# Patient Record
Sex: FEMALE | Race: BLACK OR AFRICAN AMERICAN | NOT HISPANIC OR LATINO
[De-identification: names, ages, dates, MRNs, and addresses within clinical notes are randomized per-mention and may not be internally consistent; named-entity substitution may affect disease eponyms.]

---

## 2017-01-30 ENCOUNTER — FORM ENCOUNTER (OUTPATIENT)
Age: 52
End: 2017-01-30

## 2017-01-31 ENCOUNTER — APPOINTMENT (OUTPATIENT)
Dept: INTERNAL MEDICINE | Facility: CLINIC | Age: 52
End: 2017-01-31

## 2017-01-31 ENCOUNTER — LABORATORY RESULT (OUTPATIENT)
Age: 52
End: 2017-01-31

## 2017-01-31 ENCOUNTER — OUTPATIENT (OUTPATIENT)
Dept: OUTPATIENT SERVICES | Facility: HOSPITAL | Age: 52
LOS: 1 days | End: 2017-01-31
Payer: COMMERCIAL

## 2017-01-31 VITALS
HEIGHT: 65 IN | SYSTOLIC BLOOD PRESSURE: 118 MMHG | RESPIRATION RATE: 15 BRPM | HEART RATE: 68 BPM | WEIGHT: 249 LBS | TEMPERATURE: 98.8 F | BODY MASS INDEX: 41.48 KG/M2 | OXYGEN SATURATION: 98 % | DIASTOLIC BLOOD PRESSURE: 80 MMHG

## 2017-01-31 DIAGNOSIS — Z01.818 ENCOUNTER FOR OTHER PREPROCEDURAL EXAMINATION: ICD-10-CM

## 2017-01-31 DIAGNOSIS — Z00.00 ENCOUNTER FOR GENERAL ADULT MEDICAL EXAMINATION W/OUT ABNORMAL FINDINGS: ICD-10-CM

## 2017-01-31 PROCEDURE — 71020: CPT | Mod: 26

## 2017-01-31 PROCEDURE — 71046 X-RAY EXAM CHEST 2 VIEWS: CPT

## 2017-02-01 ENCOUNTER — FORM ENCOUNTER (OUTPATIENT)
Age: 52
End: 2017-02-01

## 2017-02-01 LAB
25(OH)D3 SERPL-MCNC: 30 NG/ML
ALBUMIN SERPL ELPH-MCNC: 4.3 G/DL
ALP BLD-CCNC: 62 U/L
ALT SERPL-CCNC: 21 U/L
ANION GAP SERPL CALC-SCNC: 16 MMOL/L
APPEARANCE: CLEAR
APPEARANCE: CLEAR
APTT BLD: 32.1 SEC
AST SERPL-CCNC: 14 U/L
BASOPHILS # BLD AUTO: 0.02 K/UL
BASOPHILS NFR BLD AUTO: 0.4 %
BILIRUB SERPL-MCNC: 0.7 MG/DL
BILIRUBIN URINE: NEGATIVE
BILIRUBIN URINE: NEGATIVE
BLOOD URINE: NEGATIVE
BLOOD URINE: NEGATIVE
BUN SERPL-MCNC: 11 MG/DL
CALCIUM SERPL-MCNC: 9.9 MG/DL
CHLORIDE SERPL-SCNC: 104 MMOL/L
CHOLEST SERPL-MCNC: 212 MG/DL
CHOLEST/HDLC SERPL: 2.5 RATIO
CO2 SERPL-SCNC: 21 MMOL/L
COLOR: YELLOW
COLOR: YELLOW
CREAT SERPL-MCNC: 0.78 MG/DL
EOSINOPHIL # BLD AUTO: 0.05 K/UL
EOSINOPHIL NFR BLD AUTO: 1.1 %
GLUCOSE QUALITATIVE U: NORMAL MG/DL
GLUCOSE QUALITATIVE U: NORMAL MG/DL
GLUCOSE SERPL-MCNC: 91 MG/DL
HBA1C MFR BLD HPLC: 5.6 %
HCT VFR BLD CALC: 39.8 %
HDLC SERPL-MCNC: 86 MG/DL
HGB BLD-MCNC: 12.5 G/DL
IMM GRANULOCYTES NFR BLD AUTO: 0 %
INR PPP: 1.02 RATIO
KETONES URINE: NEGATIVE
KETONES URINE: NEGATIVE
LDLC SERPL CALC-MCNC: 113 MG/DL
LEUKOCYTE ESTERASE URINE: ABNORMAL
LEUKOCYTE ESTERASE URINE: ABNORMAL
LYMPHOCYTES # BLD AUTO: 1.65 K/UL
LYMPHOCYTES NFR BLD AUTO: 35.9 %
MAN DIFF?: NORMAL
MCHC RBC-ENTMCNC: 29.9 PG
MCHC RBC-ENTMCNC: 31.4 GM/DL
MCV RBC AUTO: 95.2 FL
MONOCYTES # BLD AUTO: 0.27 K/UL
MONOCYTES NFR BLD AUTO: 5.9 %
NEUTROPHILS # BLD AUTO: 2.6 K/UL
NEUTROPHILS NFR BLD AUTO: 56.7 %
NITRITE URINE: NEGATIVE
NITRITE URINE: NEGATIVE
PH URINE: 7
PH URINE: 7
PLATELET # BLD AUTO: 269 K/UL
POTASSIUM SERPL-SCNC: 4.1 MMOL/L
PROT SERPL-MCNC: 7.5 G/DL
PROTEIN URINE: NEGATIVE MG/DL
PROTEIN URINE: NEGATIVE MG/DL
PT BLD: 11.5 SEC
RBC # BLD: 4.18 M/UL
RBC # FLD: 14 %
SODIUM SERPL-SCNC: 141 MMOL/L
SPECIFIC GRAVITY URINE: 1.01
SPECIFIC GRAVITY URINE: 1.01
TRIGL SERPL-MCNC: 63 MG/DL
UROBILINOGEN URINE: NORMAL MG/DL
UROBILINOGEN URINE: NORMAL MG/DL
WBC # FLD AUTO: 4.59 K/UL

## 2017-02-02 ENCOUNTER — OUTPATIENT (OUTPATIENT)
Dept: OUTPATIENT SERVICES | Facility: HOSPITAL | Age: 52
LOS: 1 days | End: 2017-02-02
Payer: COMMERCIAL

## 2017-02-02 PROCEDURE — 73700 CT LOWER EXTREMITY W/O DYE: CPT

## 2017-02-02 PROCEDURE — 73700 CT LOWER EXTREMITY W/O DYE: CPT | Mod: 26,50

## 2017-02-06 ENCOUNTER — OUTPATIENT (OUTPATIENT)
Dept: OUTPATIENT SERVICES | Facility: HOSPITAL | Age: 52
LOS: 1 days | End: 2017-02-06
Payer: COMMERCIAL

## 2017-02-06 DIAGNOSIS — Z22.321 CARRIER OR SUSPECTED CARRIER OF METHICILLIN SUSCEPTIBLE STAPHYLOCOCCUS AUREUS: ICD-10-CM

## 2017-02-06 LAB
MRSA PCR RESULT.: NEGATIVE — SIGNIFICANT CHANGE UP
S AUREUS DNA NOSE QL NAA+PROBE: NEGATIVE — SIGNIFICANT CHANGE UP

## 2017-02-06 PROCEDURE — 87641 MR-STAPH DNA AMP PROBE: CPT

## 2017-02-07 ENCOUNTER — TRANSCRIPTION ENCOUNTER (OUTPATIENT)
Age: 52
End: 2017-02-07

## 2017-02-09 ENCOUNTER — MEDICATION RENEWAL (OUTPATIENT)
Age: 52
End: 2017-02-09

## 2017-02-15 ENCOUNTER — RESULT REVIEW (OUTPATIENT)
Age: 52
End: 2017-02-15

## 2017-02-15 VITALS
WEIGHT: 242.51 LBS | TEMPERATURE: 98 F | HEART RATE: 73 BPM | RESPIRATION RATE: 16 BRPM | OXYGEN SATURATION: 98 % | HEIGHT: 65 IN | SYSTOLIC BLOOD PRESSURE: 144 MMHG | DIASTOLIC BLOOD PRESSURE: 78 MMHG

## 2017-02-15 RX ORDER — CELECOXIB 200 MG/1
200 CAPSULE ORAL ONCE
Qty: 0 | Refills: 0 | Status: COMPLETED | OUTPATIENT
Start: 2017-02-16 | End: 2017-02-16

## 2017-02-15 NOTE — H&P ADULT. - HISTORY OF PRESENT ILLNESS
51F with left hip pain x  Presents today for left LENA, robotic assisted. 51F with left hip pain x 2-3y. Pt. denies any accident/injury but states she has wear and tear/arthritis. Pt. states she had 2 steroid injections which the 2nd injection didn't relieve her pain. Pt. c/o pain with "everything." Denies any walker assistance, numbness/tingling in left le.   Presents today for left LENA, robotic assisted.

## 2017-02-15 NOTE — H&P ADULT. - LAB RESULTS AND INTERPRETATION
CBC, BMP, PT, PTT, INR, within normal limits  Urine culture showed gram negative rods, UA with moderate leuk esterase. Will repeat DOS if not treated.

## 2017-02-15 NOTE — H&P ADULT. - PROBLEM SELECTOR PLAN 1
Admit to Orthopedics  Medically optimized by Dr. Rosenberg  Elective left LENA robotic assisted today   DOS

## 2017-02-15 NOTE — PRE-OP CHECKLIST - SELECT TESTS ORDERED
EKG/INR/CBC/MRSA, MSSA- negative/Urinalysis/CXR/PT/PTT/BMP Urinalysis/UCG/INR/PT/PTT/MRSA, MSSA- negative, ICON Negative/CBC/BMP/CXR/EKG

## 2017-02-15 NOTE — H&P ADULT. - EKG AND INTERPRETATION
EKG shows sinus rhythm, first degree AV block, minor inferior repolarization disturbance, flat or low neg T in aVF, negative T in III. Reviewed by medical clearance

## 2017-02-16 ENCOUNTER — APPOINTMENT (OUTPATIENT)
Dept: ORTHOPEDIC SURGERY | Facility: HOSPITAL | Age: 52
End: 2017-02-16

## 2017-02-16 ENCOUNTER — INPATIENT (INPATIENT)
Facility: HOSPITAL | Age: 52
LOS: 1 days | Discharge: HOME HEALTH | DRG: 470 | End: 2017-02-18
Attending: ORTHOPAEDIC SURGERY | Admitting: ORTHOPAEDIC SURGERY
Payer: COMMERCIAL

## 2017-02-16 DIAGNOSIS — M16.12 UNILATERAL PRIMARY OSTEOARTHRITIS, LEFT HIP: ICD-10-CM

## 2017-02-16 DIAGNOSIS — Z41.9 ENCOUNTER FOR PROCEDURE FOR PURPOSES OTHER THAN REMEDYING HEALTH STATE, UNSPECIFIED: Chronic | ICD-10-CM

## 2017-02-16 LAB
ANION GAP SERPL CALC-SCNC: 6 MMOL/L — LOW (ref 9–16)
APPEARANCE UR: CLEAR — SIGNIFICANT CHANGE UP
BACTERIA # UR AUTO: PRESENT /HPF
BILIRUB UR-MCNC: NEGATIVE — SIGNIFICANT CHANGE UP
BUN SERPL-MCNC: 13 MG/DL — SIGNIFICANT CHANGE UP (ref 7–23)
CALCIUM SERPL-MCNC: 9 MG/DL — SIGNIFICANT CHANGE UP (ref 8.5–10.5)
CHLORIDE SERPL-SCNC: 111 MMOL/L — HIGH (ref 96–108)
CO2 SERPL-SCNC: 28 MMOL/L — SIGNIFICANT CHANGE UP (ref 22–31)
COLOR SPEC: YELLOW — SIGNIFICANT CHANGE UP
CREAT SERPL-MCNC: 0.77 MG/DL — SIGNIFICANT CHANGE UP (ref 0.5–1.3)
DIFF PNL FLD: (no result)
EPI CELLS # UR: SIGNIFICANT CHANGE UP /HPF
GLUCOSE SERPL-MCNC: 114 MG/DL — HIGH (ref 70–99)
GLUCOSE UR QL: NEGATIVE — SIGNIFICANT CHANGE UP
HCT VFR BLD CALC: 37.8 % — SIGNIFICANT CHANGE UP (ref 34.5–45)
HGB BLD-MCNC: 12.4 G/DL — SIGNIFICANT CHANGE UP (ref 11.5–15.5)
KETONES UR-MCNC: NEGATIVE — SIGNIFICANT CHANGE UP
LEUKOCYTE ESTERASE UR-ACNC: (no result)
MCHC RBC-ENTMCNC: 30.3 PG — SIGNIFICANT CHANGE UP (ref 27–34)
MCHC RBC-ENTMCNC: 32.8 G/DL — SIGNIFICANT CHANGE UP (ref 32–36)
MCV RBC AUTO: 92.4 FL — SIGNIFICANT CHANGE UP (ref 80–100)
NITRITE UR-MCNC: NEGATIVE — SIGNIFICANT CHANGE UP
PH UR: 5.5 — SIGNIFICANT CHANGE UP (ref 4–8)
PLATELET # BLD AUTO: 205 K/UL — SIGNIFICANT CHANGE UP (ref 150–400)
POTASSIUM SERPL-MCNC: 4.6 MMOL/L — SIGNIFICANT CHANGE UP (ref 3.5–5.3)
POTASSIUM SERPL-SCNC: 4.6 MMOL/L — SIGNIFICANT CHANGE UP (ref 3.5–5.3)
PROT UR-MCNC: NEGATIVE MG/DL — SIGNIFICANT CHANGE UP
RBC # BLD: 4.09 M/UL — SIGNIFICANT CHANGE UP (ref 3.8–5.2)
RBC # FLD: 13.2 % — SIGNIFICANT CHANGE UP (ref 10.3–16.9)
RBC CASTS # UR COMP ASSIST: < 5 /HPF — SIGNIFICANT CHANGE UP
SODIUM SERPL-SCNC: 145 MMOL/L — SIGNIFICANT CHANGE UP (ref 135–145)
SP GR SPEC: 1.02 — SIGNIFICANT CHANGE UP (ref 1–1.03)
UROBILINOGEN FLD QL: 0.2 E.U./DL — SIGNIFICANT CHANGE UP
WBC # BLD: 7.6 K/UL — SIGNIFICANT CHANGE UP (ref 3.8–10.5)
WBC # FLD AUTO: 7.6 K/UL — SIGNIFICANT CHANGE UP (ref 3.8–10.5)
WBC UR QL: > 10 /HPF

## 2017-02-16 PROCEDURE — 27130 TOTAL HIP ARTHROPLASTY: CPT | Mod: LT

## 2017-02-16 PROCEDURE — 72170 X-RAY EXAM OF PELVIS: CPT | Mod: 26

## 2017-02-16 RX ORDER — MAGNESIUM HYDROXIDE 400 MG/1
30 TABLET, CHEWABLE ORAL DAILY
Qty: 0 | Refills: 0 | Status: DISCONTINUED | OUTPATIENT
Start: 2017-02-16 | End: 2017-02-18

## 2017-02-16 RX ORDER — OXYCODONE HYDROCHLORIDE 5 MG/1
10 TABLET ORAL EVERY 4 HOURS
Qty: 0 | Refills: 0 | Status: DISCONTINUED | OUTPATIENT
Start: 2017-02-16 | End: 2017-02-17

## 2017-02-16 RX ORDER — POLYETHYLENE GLYCOL 3350 17 G/17G
17 POWDER, FOR SOLUTION ORAL DAILY
Qty: 0 | Refills: 0 | Status: DISCONTINUED | OUTPATIENT
Start: 2017-02-16 | End: 2017-02-18

## 2017-02-16 RX ORDER — HYDROMORPHONE HYDROCHLORIDE 2 MG/ML
2 INJECTION INTRAMUSCULAR; INTRAVENOUS; SUBCUTANEOUS ONCE
Qty: 0 | Refills: 0 | Status: DISCONTINUED | OUTPATIENT
Start: 2017-02-16 | End: 2017-02-16

## 2017-02-16 RX ORDER — ACETAMINOPHEN 500 MG
650 TABLET ORAL EVERY 6 HOURS
Qty: 0 | Refills: 0 | Status: DISCONTINUED | OUTPATIENT
Start: 2017-02-16 | End: 2017-02-17

## 2017-02-16 RX ORDER — SENNA PLUS 8.6 MG/1
2 TABLET ORAL AT BEDTIME
Qty: 0 | Refills: 0 | Status: DISCONTINUED | OUTPATIENT
Start: 2017-02-16 | End: 2017-02-18

## 2017-02-16 RX ORDER — SODIUM CHLORIDE 9 MG/ML
1000 INJECTION, SOLUTION INTRAVENOUS
Qty: 0 | Refills: 0 | Status: DISCONTINUED | OUTPATIENT
Start: 2017-02-16 | End: 2017-02-18

## 2017-02-16 RX ORDER — CELECOXIB 200 MG/1
200 CAPSULE ORAL
Qty: 0 | Refills: 0 | Status: DISCONTINUED | OUTPATIENT
Start: 2017-02-16 | End: 2017-02-18

## 2017-02-16 RX ORDER — GABAPENTIN 400 MG/1
100 CAPSULE ORAL
Qty: 0 | Refills: 0 | Status: DISCONTINUED | OUTPATIENT
Start: 2017-02-16 | End: 2017-02-18

## 2017-02-16 RX ORDER — PANTOPRAZOLE SODIUM 20 MG/1
40 TABLET, DELAYED RELEASE ORAL DAILY
Qty: 0 | Refills: 0 | Status: DISCONTINUED | OUTPATIENT
Start: 2017-02-16 | End: 2017-02-18

## 2017-02-16 RX ORDER — ASPIRIN/CALCIUM CARB/MAGNESIUM 324 MG
325 TABLET ORAL
Qty: 0 | Refills: 0 | Status: DISCONTINUED | OUTPATIENT
Start: 2017-02-16 | End: 2017-02-18

## 2017-02-16 RX ORDER — CEFAZOLIN SODIUM 1 G
2000 VIAL (EA) INJECTION EVERY 8 HOURS
Qty: 0 | Refills: 0 | Status: COMPLETED | OUTPATIENT
Start: 2017-02-16 | End: 2017-02-16

## 2017-02-16 RX ORDER — ONDANSETRON 8 MG/1
4 TABLET, FILM COATED ORAL EVERY 6 HOURS
Qty: 0 | Refills: 0 | Status: DISCONTINUED | OUTPATIENT
Start: 2017-02-16 | End: 2017-02-18

## 2017-02-16 RX ORDER — HYDROMORPHONE HYDROCHLORIDE 2 MG/ML
0.5 INJECTION INTRAMUSCULAR; INTRAVENOUS; SUBCUTANEOUS
Qty: 0 | Refills: 0 | Status: DISCONTINUED | OUTPATIENT
Start: 2017-02-16 | End: 2017-02-18

## 2017-02-16 RX ORDER — KETOROLAC TROMETHAMINE 30 MG/ML
15 SYRINGE (ML) INJECTION EVERY 4 HOURS
Qty: 0 | Refills: 0 | Status: DISCONTINUED | OUTPATIENT
Start: 2017-02-16 | End: 2017-02-16

## 2017-02-16 RX ORDER — OXYCODONE HYDROCHLORIDE 5 MG/1
5 TABLET ORAL EVERY 4 HOURS
Qty: 0 | Refills: 0 | Status: DISCONTINUED | OUTPATIENT
Start: 2017-02-16 | End: 2017-02-17

## 2017-02-16 RX ORDER — SODIUM CHLORIDE 9 MG/ML
500 INJECTION, SOLUTION INTRAVENOUS ONCE
Qty: 0 | Refills: 0 | Status: COMPLETED | OUTPATIENT
Start: 2017-02-16 | End: 2017-02-16

## 2017-02-16 RX ORDER — HYDROMORPHONE HYDROCHLORIDE 2 MG/ML
1 INJECTION INTRAMUSCULAR; INTRAVENOUS; SUBCUTANEOUS
Qty: 0 | Refills: 0 | Status: DISCONTINUED | OUTPATIENT
Start: 2017-02-16 | End: 2017-02-16

## 2017-02-16 RX ORDER — DOCUSATE SODIUM 100 MG
100 CAPSULE ORAL THREE TIMES A DAY
Qty: 0 | Refills: 0 | Status: DISCONTINUED | OUTPATIENT
Start: 2017-02-16 | End: 2017-02-18

## 2017-02-16 RX ORDER — GABAPENTIN 400 MG/1
0 CAPSULE ORAL
Qty: 0 | Refills: 0 | COMMUNITY

## 2017-02-16 RX ORDER — BUPIVACAINE 13.3 MG/ML
20 INJECTION, SUSPENSION, LIPOSOMAL INFILTRATION ONCE
Qty: 0 | Refills: 0 | Status: DISCONTINUED | OUTPATIENT
Start: 2017-02-16 | End: 2017-02-18

## 2017-02-16 RX ADMIN — Medication 15 MILLIGRAM(S): at 17:03

## 2017-02-16 RX ADMIN — Medication 100 MILLIGRAM(S): at 21:44

## 2017-02-16 RX ADMIN — Medication 100 MILLIGRAM(S): at 23:58

## 2017-02-16 RX ADMIN — Medication 100 MILLIGRAM(S): at 17:56

## 2017-02-16 RX ADMIN — SODIUM CHLORIDE 125 MILLILITER(S): 9 INJECTION, SOLUTION INTRAVENOUS at 23:43

## 2017-02-16 RX ADMIN — CELECOXIB 200 MILLIGRAM(S): 200 CAPSULE ORAL at 07:29

## 2017-02-16 RX ADMIN — GABAPENTIN 100 MILLIGRAM(S): 400 CAPSULE ORAL at 21:45

## 2017-02-16 RX ADMIN — Medication 325 MILLIGRAM(S): at 17:56

## 2017-02-16 RX ADMIN — SODIUM CHLORIDE 125 MILLILITER(S): 9 INJECTION, SOLUTION INTRAVENOUS at 13:34

## 2017-02-16 RX ADMIN — OXYCODONE HYDROCHLORIDE 10 MILLIGRAM(S): 5 TABLET ORAL at 19:36

## 2017-02-16 RX ADMIN — SENNA PLUS 2 TABLET(S): 8.6 TABLET ORAL at 21:45

## 2017-02-16 RX ADMIN — OXYCODONE HYDROCHLORIDE 10 MILLIGRAM(S): 5 TABLET ORAL at 20:15

## 2017-02-16 RX ADMIN — Medication 15 MILLIGRAM(S): at 17:20

## 2017-02-16 RX ADMIN — SODIUM CHLORIDE 250 MILLILITER(S): 9 INJECTION, SOLUTION INTRAVENOUS at 13:52

## 2017-02-16 NOTE — PHYSICAL THERAPY INITIAL EVALUATION ADULT - ADDITIONAL COMMENTS
Patient lives Patient lives in one level home with no stairs to enter secondary to elevator. Patient has a baseline tolerance of ambulating 5 city blocks before pain is a barrier with no AD. Equipment ordered.

## 2017-02-16 NOTE — PHYSICAL THERAPY INITIAL EVALUATION ADULT - CRITERIA FOR SKILLED THERAPEUTIC INTERVENTIONS
impairments found/functional limitations in following categories/risk reduction/prevention/rehab potential

## 2017-02-16 NOTE — PHYSICAL THERAPY INITIAL EVALUATION ADULT - DIAGNOSIS, PT EVAL
Pattern 4H: Impaired Joint Mobility, Motor Function, Muscle Performance, and Range of Motion Associated  With Joint Arthroplasty

## 2017-02-16 NOTE — PHYSICAL THERAPY INITIAL EVALUATION ADULT - PERTINENT HX OF CURRENT PROBLEM, REHAB EVAL
51F with left hip pain x 2-3y. Pt. denies any accident/injury but states she has wear and tear/arthritis. Pt. states she had 2 steroid injections which the 2nd injection didn't relieve her pain. Pt. c/o pain with "everything." Denies any walker assistance, numbness/tingling in left le.

## 2017-02-16 NOTE — PROGRESS NOTE ADULT - SUBJECTIVE AND OBJECTIVE BOX
Orthopaedic Post Op Check Note    Procedure: Left Total Hip Arthroplasty   Surgeon: Dr. Garcia    51y Female comfortable, without complaints. Pain control adequate at the time of my visit though pt endorses some mild paresthesias to bilateral LE which are improving with time. Pt BP 88/42 at the time of my visit. Pt denies lightheadedness, vision changes, SOB, N/V, CP, palpitations.    PE: AVSS, NAD  Vital Signs Last 24 Hrs  HR: 56 (55 - 62)  BP: 105/71 (88/42 - 118/62)  RR: 16 (14 - 16)  SpO2: 99% (99% - 100%)    General: Pt alert and oriented, reclined comfortably in hospital bed in NAD.  Dressing: C/D/I to Left hip  Motor: Motor Strength 5/5 to TA/GS/EHL/FHL bilaterally.   Neuro: Mild paresthesias to BLE distal to the knees and also lateral mild paresthesias to Left lateral thigh. However, sensation is intact to bilateral LE distally.   Pulses: 2+ DP, toes warm and well perfused.    Post Op labs:  16 Feb 2017 12:26    145    |  111    |  13     ----------------------------<  114    4.6     |  28     |  0.77     Ca    9.0        16 Feb 2017 12:26                            12.4   7.6   )-----------( 205      ( 16 Feb 2017 12:26 )             37.8       Post op X-Ray: ____    A/P: 51y Female POD#0 s/p Left Total Hip Arthroplasty Robotic Assisted with slight asymptomatic post-op hypotension  - Discussed with RN to administer LR bolus. Repeat BP was 118/71 at the time of my departure.  - Stable  - Pain Control adequate  - DVT ppx: ASA 325mg BID, venodynes   - Donna op IV abx: Ancef  - PT, WBS: WBAT  - IVF: Lactated Ringer's  - F/U AM Labs    Sabine López PA-C  Ortho Consult Pager: [phone]

## 2017-02-16 NOTE — PHYSICAL THERAPY INITIAL EVALUATION ADULT - IMPAIRMENTS FOUND, PT EVAL
muscle strength/aerobic capacity/endurance/ergonomics and body mechanics/gait, locomotion, and balance

## 2017-02-17 ENCOUNTER — TRANSCRIPTION ENCOUNTER (OUTPATIENT)
Age: 52
End: 2017-02-17

## 2017-02-17 LAB
ANION GAP SERPL CALC-SCNC: 6 MMOL/L — LOW (ref 9–16)
BUN SERPL-MCNC: 11 MG/DL — SIGNIFICANT CHANGE UP (ref 7–23)
CALCIUM SERPL-MCNC: 8.9 MG/DL — SIGNIFICANT CHANGE UP (ref 8.5–10.5)
CHLORIDE SERPL-SCNC: 107 MMOL/L — SIGNIFICANT CHANGE UP (ref 96–108)
CO2 SERPL-SCNC: 29 MMOL/L — SIGNIFICANT CHANGE UP (ref 22–31)
CREAT SERPL-MCNC: 0.67 MG/DL — SIGNIFICANT CHANGE UP (ref 0.5–1.3)
GLUCOSE SERPL-MCNC: 89 MG/DL — SIGNIFICANT CHANGE UP (ref 70–99)
HCT VFR BLD CALC: 33.1 % — LOW (ref 34.5–45)
HGB BLD-MCNC: 11.1 G/DL — LOW (ref 11.5–15.5)
MCHC RBC-ENTMCNC: 30.6 PG — SIGNIFICANT CHANGE UP (ref 27–34)
MCHC RBC-ENTMCNC: 33.5 G/DL — SIGNIFICANT CHANGE UP (ref 32–36)
MCV RBC AUTO: 91.2 FL — SIGNIFICANT CHANGE UP (ref 80–100)
PLATELET # BLD AUTO: 174 K/UL — SIGNIFICANT CHANGE UP (ref 150–400)
POTASSIUM SERPL-MCNC: 3.8 MMOL/L — SIGNIFICANT CHANGE UP (ref 3.5–5.3)
POTASSIUM SERPL-SCNC: 3.8 MMOL/L — SIGNIFICANT CHANGE UP (ref 3.5–5.3)
RBC # BLD: 3.63 M/UL — LOW (ref 3.8–5.2)
RBC # FLD: 12.6 % — SIGNIFICANT CHANGE UP (ref 10.3–16.9)
SODIUM SERPL-SCNC: 142 MMOL/L — SIGNIFICANT CHANGE UP (ref 135–145)
WBC # BLD: 5.7 K/UL — SIGNIFICANT CHANGE UP (ref 3.8–10.5)
WBC # FLD AUTO: 5.7 K/UL — SIGNIFICANT CHANGE UP (ref 3.8–10.5)

## 2017-02-17 PROCEDURE — 99253 IP/OBS CNSLTJ NEW/EST LOW 45: CPT

## 2017-02-17 PROCEDURE — 99221 1ST HOSP IP/OBS SF/LOW 40: CPT

## 2017-02-17 RX ORDER — MULTIVIT-MIN/FERROUS GLUCONATE 9 MG/15 ML
1 LIQUID (ML) ORAL
Qty: 0 | Refills: 0 | COMMUNITY

## 2017-02-17 RX ORDER — FLUCONAZOLE 150 MG/1
150 TABLET ORAL ONCE
Qty: 0 | Refills: 0 | Status: COMPLETED | OUTPATIENT
Start: 2017-02-17 | End: 2017-02-17

## 2017-02-17 RX ORDER — AZTREONAM 2 G
1 VIAL (EA) INJECTION
Qty: 6 | Refills: 0 | OUTPATIENT
Start: 2017-02-17 | End: 2017-02-20

## 2017-02-17 RX ORDER — DOCUSATE SODIUM 100 MG
1 CAPSULE ORAL
Qty: 0 | Refills: 0 | COMMUNITY
Start: 2017-02-17

## 2017-02-17 RX ORDER — ACETAMINOPHEN 500 MG
0 TABLET ORAL
Qty: 0 | Refills: 0 | COMMUNITY

## 2017-02-17 RX ORDER — IBUPROFEN 200 MG
0 TABLET ORAL
Qty: 0 | Refills: 0 | COMMUNITY

## 2017-02-17 RX ORDER — POLYETHYLENE GLYCOL 3350 17 G/17G
17 POWDER, FOR SOLUTION ORAL
Qty: 0 | Refills: 0 | COMMUNITY
Start: 2017-02-17

## 2017-02-17 RX ORDER — OMEPRAZOLE 10 MG/1
1 CAPSULE, DELAYED RELEASE ORAL
Qty: 0 | Refills: 0 | COMMUNITY

## 2017-02-17 RX ORDER — CELECOXIB 200 MG/1
1 CAPSULE ORAL
Qty: 0 | Refills: 0 | COMMUNITY

## 2017-02-17 RX ORDER — ASPIRIN/CALCIUM CARB/MAGNESIUM 324 MG
1 TABLET ORAL
Qty: 0 | Refills: 0 | COMMUNITY
Start: 2017-02-17

## 2017-02-17 RX ADMIN — Medication 325 MILLIGRAM(S): at 18:00

## 2017-02-17 RX ADMIN — CELECOXIB 200 MILLIGRAM(S): 200 CAPSULE ORAL at 18:59

## 2017-02-17 RX ADMIN — FLUCONAZOLE 150 MILLIGRAM(S): 150 TABLET ORAL at 22:24

## 2017-02-17 RX ADMIN — POLYETHYLENE GLYCOL 3350 17 GRAM(S): 17 POWDER, FOR SOLUTION ORAL at 12:19

## 2017-02-17 RX ADMIN — PANTOPRAZOLE SODIUM 40 MILLIGRAM(S): 20 TABLET, DELAYED RELEASE ORAL at 12:19

## 2017-02-17 RX ADMIN — CELECOXIB 200 MILLIGRAM(S): 200 CAPSULE ORAL at 17:59

## 2017-02-17 RX ADMIN — GABAPENTIN 100 MILLIGRAM(S): 400 CAPSULE ORAL at 17:59

## 2017-02-17 RX ADMIN — OXYCODONE HYDROCHLORIDE 5 MILLIGRAM(S): 5 TABLET ORAL at 11:16

## 2017-02-17 RX ADMIN — OXYCODONE HYDROCHLORIDE 5 MILLIGRAM(S): 5 TABLET ORAL at 07:00

## 2017-02-17 RX ADMIN — SENNA PLUS 2 TABLET(S): 8.6 TABLET ORAL at 21:47

## 2017-02-17 RX ADMIN — Medication 100 MILLIGRAM(S): at 14:17

## 2017-02-17 RX ADMIN — Medication 100 MILLIGRAM(S): at 06:17

## 2017-02-17 RX ADMIN — OXYCODONE HYDROCHLORIDE 5 MILLIGRAM(S): 5 TABLET ORAL at 06:20

## 2017-02-17 RX ADMIN — CELECOXIB 200 MILLIGRAM(S): 200 CAPSULE ORAL at 09:43

## 2017-02-17 RX ADMIN — CELECOXIB 200 MILLIGRAM(S): 200 CAPSULE ORAL at 10:43

## 2017-02-17 RX ADMIN — OXYCODONE HYDROCHLORIDE 5 MILLIGRAM(S): 5 TABLET ORAL at 10:16

## 2017-02-17 RX ADMIN — GABAPENTIN 100 MILLIGRAM(S): 400 CAPSULE ORAL at 06:17

## 2017-02-17 RX ADMIN — Medication 325 MILLIGRAM(S): at 06:17

## 2017-02-17 RX ADMIN — Medication 1 TABLET(S): at 22:24

## 2017-02-17 RX ADMIN — Medication 100 MILLIGRAM(S): at 21:47

## 2017-02-17 NOTE — CONSULT NOTE ADULT - SUBJECTIVE AND OBJECTIVE BOX
Patient seen and examined, Chart reviewed    HPI:  51F with left hip pain x 2-3y. Pt. denies any accident/injury but states she has wear and tear/arthritis. Pt. states she had 2 steroid injections which the 2nd injection didn't relieve her pain. Pt. c/o pain with "everything." Denies any walker assistance, numbness/tingling in left leg.   She is now s/p left LENA - post op day1      PAST MEDICAL & SURGICAL HISTORY:  OA (osteoarthritis)  Lumbar herniated disc  Anemia  Surgery, elective: breast reduction   delivery delivered      REVIEW OF SYSTEMS:  CONSTITUTIONAL:  No night sweats.  No fatigue,  No fever or chills.  RESPIRATORY:  No cough.  No wheeze.    No shortness of breath.  CARDIOVASCULAR:  No chest pains.  No palpitations.  GASTROINTESTINAL:  No abdominal pain.  No nausea or vomiting.  No diarrhea   GENITOURINARY:  No urgency.  No frequency.  No dysuria.      MUSCULOSKELETAL:  left hip pain present   SKIN:  No rashes.     aspirin enteric coated 325milliGRAM(s) Oral two times a day  acetaminophen   Tablet 650milliGRAM(s) Oral every 6 hours PRN  celecoxib 200milliGRAM(s) Oral two times a day after meals  oxyCODONE IR 5milliGRAM(s) Oral every 4 hours PRN  oxyCODONE IR 10milliGRAM(s) Oral every 4 hours PRN  aluminum hydroxide/magnesium hydroxide/simethicone Suspension 30milliLiter(s) Oral four times a day PRN  ondansetron Injectable 4milliGRAM(s) IV Push every 6 hours PRN  pantoprazole    Tablet 40milliGRAM(s) Oral daily  polyethylene glycol 3350 17Gram(s) Oral daily  senna 2Tablet(s) Oral at bedtime  magnesium hydroxide Suspension 30milliLiter(s) Oral daily PRN  docusate sodium 100milliGRAM(s) Oral three times a day  gabapentin 100milliGRAM(s) Oral two times a day  BUpivacaine liposome 1.3% Injectable (no eMAR) 20milliLiter(s) Local Injection once  HYDROmorphone  Injectable 0.5milliGRAM(s) IV Push every 3 hours PRN  lactated ringers. 1000milliLiter(s) IV Continuous <Continuous>      Allergies    morphine (Pruritus)  penicillin (Hives)      SOCIAL HISTORY: no tob    FAMILY HISTORY: nc      Vital Signs Last 24 Hrs  T(C): 36.7, Max: 36.7 ( @ 20:16)  T(F): 98, Max: 98 ( @ 20:16)  HR: 60 (53 - 78)  BP: 98/52 (88/42 - 134/85)  BP(mean): --  RR: 18 (13 - 18)  SpO2: 99% (98% - 100%)    I & Os for current day (as of  @ 07:55)  =============================================  IN: 1795 ml / OUT: 1350 ml / NET: 445 ml      PHYSICAL EXAM:   General - NAD, Alert and oriented x 3,   Cardiovascular - RRR no m/r/g, no JVD  Lungs - Clear to auscltation, no use of acessory muscles, no crackles or wheezes.  Skin - No rashes, skin warm and dry, no erythematous areas  Abdomen - Normal bowel sounds, abdomen soft and nontender  Extremeties - No edema, no calf tenderness    LABS:                        12.4   7.6   )-----------( 205      ( 2017 12:26 )             37.8     2017 12:26    145    |  111    |  13     ----------------------------<  114    4.6     |  28     |  0.77     Ca    9.0        2017 12:26        Urinalysis Basic - ( 2017 07:42 )    Color: Yellow / Appearance: Clear / S.025 / pH: x  Gluc: x / Ketone: NEGATIVE  / Bili: NEGATIVE / Urobili: 0.2 E.U./dL   Blood: x / Protein: NEGATIVE mg/dL / Nitrite: NEGATIVE   Leuk Esterase: Moderate / RBC: < 5 /HPF / WBC > 10 /HPF   Sq Epi: x / Non Sq Epi: Few /HPF / Bacteria: Present /HPF        RADIOLOGY & ADDITIONAL STUDIES:

## 2017-02-17 NOTE — DISCHARGE NOTE ADULT - MEDICATION SUMMARY - MEDICATIONS TO TAKE
I will START or STAY ON the medications listed below when I get home from the hospital:    aspirin 325 mg oral delayed release tablet  -- 1 tab(s) by mouth 2 times a day. Please take for 4 weeks after surgery.  -- Indication: For Prevent blood clot    gabapentin 100 mg oral tablet  --  by mouth 2 times a day  -- Indication: For home meds    docusate sodium 100 mg oral capsule  -- 1 cap(s) by mouth 3 times a day  -- Indication: For Stool softener    polyethylene glycol 3350 oral powder for reconstitution  -- 17 gram(s) by mouth once a day  -- Indication: For Prevent constipation I will START or STAY ON the medications listed below when I get home from the hospital:    aspirin 325 mg oral delayed release tablet  -- 1 tab(s) by mouth 2 times a day. Please take for 4 weeks after surgery.  -- Indication: For Prevent blood clot    celecoxib 200 mg oral capsule  -- 1 cap(s) by mouth 2 times a day  -- Indication: For Pain    Percocet 5/325 oral tablet  -- 1 tab(s) by mouth every 6 hours, As Needed  -- Indication: For Pain    gabapentin 100 mg oral tablet  --  by mouth 2 times a day  -- Indication: For home meds    docusate sodium 100 mg oral capsule  -- 1 cap(s) by mouth 3 times a day  -- Indication: For Stool softener    polyethylene glycol 3350 oral powder for reconstitution  -- 17 gram(s) by mouth once a day  -- Indication: For Prevent constipation    sulfamethoxazole-trimethoprim 800 mg-160 mg oral tablet  -- 1 tab(s) by mouth 2 times a day  -- Indication: For UTI    omeprazole 20 mg oral delayed release capsule  -- 1 cap(s) by mouth once a day  -- Indication: For Gastritis

## 2017-02-17 NOTE — DISCHARGE NOTE ADULT - CARE PROVIDER_API CALL
Kamron Garcia), Orthopaedic Surgery  [address] [place] [address]  Phone: [phone]  Fax: [phone]

## 2017-02-17 NOTE — PROGRESS NOTE ADULT - ASSESSMENT
51y f s/p L LENA, posterior  POD#1  -Pain Control  -PT WBAT, posterior precautions   -DVT PPX ASA  -Dispo Planning

## 2017-02-17 NOTE — DISCHARGE NOTE ADULT - HOSPITAL COURSE
Admit 2/16/17  Surgery S/P left LENA (posterior) 2/16/17  Pre/post-op Antibiotics  DVT prophylaxis  Physical Therapy  Pain Management

## 2017-02-17 NOTE — DISCHARGE NOTE ADULT - CARE PROVIDERS DIRECT ADDRESSES
[email or web address]

## 2017-02-17 NOTE — DISCHARGE NOTE ADULT - HOME CARE AGENCY
[facility]   Phone: [phone]  start of homecare pending insurance authorization  Physical therapy review with patient home exercises prior to discharge

## 2017-02-17 NOTE — DISCHARGE NOTE ADULT - ADDITIONAL INSTRUCTIONS
No strenuous activity, heavy lifting, driving, tub bathing, or returning to work until cleared by MD.  You may shower--dressing is waterproof.  Remove dressing after post op day 7, then leave incision open to air.  Follow up with Dr. Garcia call  to schedule an appt within 10-14 days.  If you don't have a bowel movement by post op day 3, then take Milk of Magnesia (over the counter).  If no bowel movement by at least post op day 5, then use a Dulcolax suppository (over the counter) and/or a Fleets enema--if still no bowel movement, call your MD.  Contact your doctor if you experience: fever greater than 101.5, chills, chest pain, difficulty breathing, bleeding, redness or heat around the incision.     Please take meds as Prescribed by Dr Garcia.  Please follow up with your primary care provider. Take Bactrim DS twice a day for 3 days TOTAL, including doses taken in the hospital  Celecoxib 200mg 1 tablet twice daily for 6 weeks  Percocet 5mg/325mg take 1-2 tablets by mouth every 4 to 6 hours as needed, maximum 10 tablets per day  Omeprazole 20 mg 1 tablet daily for 4 weeks  Ecotrin (enteric-coated aspirin) 325mg 1 tablet by mouth every 12 hours until 4 weeks after surgery    No strenuous activity, heavy lifting, driving, tub bathing, or returning to work until cleared by MD.  You may shower--dressing is waterproof.  Remove dressing after post op day 7, then leave incision open to air.  Follow up with Dr. Garcia call  to schedule an appt within 10-14 days.  If you don't have a bowel movement by post op day 3, then take Milk of Magnesia (over the counter).  If no bowel movement by at least post op day 5, then use a Dulcolax suppository (over the counter) and/or a Fleets enema--if still no bowel movement, call your MD.  Contact your doctor if you experience: fever greater than 101.5, chills, chest pain, difficulty breathing, bleeding, redness or heat around the incision.     Please take meds as Prescribed by Dr Garcia.  Please follow up with your primary care provider.

## 2017-02-17 NOTE — PROGRESS NOTE ADULT - SUBJECTIVE AND OBJECTIVE BOX
NO events    T(F): 98, Max: 98 (02-16 @ 20:16)  HR: 60 (53 - 78)  BP: 98/52 (88/42 - 134/85)  RR: 18 (13 - 18)  SpO2: 99% (98% - 100%)  Wt(kg): --    I & Os for current day (as of 02-17 @ 07:39)  =============================================  IN:    lactated ringers.: 1125 ml    Lactated Ringers IV Bolus: 500 ml    Oral Fluid: 120 ml    IV PiggyBack: 50 ml    Total IN: 1795 ml  ---------------------------------------------  OUT:    Voided: 1350 ml    Total OUT: 1350 ml  ---------------------------------------------  Total NET: 445 ml    PE: DSG CDI. LLE Sensation to light touch intact S/S/DP/SP/Tib, Strength EHL/FHL/TA/GS 5/5, DP 2+, Ext WWP

## 2017-02-17 NOTE — DISCHARGE NOTE ADULT - CARE PLAN
Principal Discharge DX:	Primary osteoarthritis of left hip  Goal:	Pain Management, improve mobility  Instructions for follow-up, activity and diet:	see below

## 2017-02-17 NOTE — CONSULT NOTE ADULT - ASSESSMENT
50 yo female with hx of left hip pain djd, sciatica and anemia s/p left LENA  - OOB with PT this morning  - Pain mnagement / bowel regimen  - DVT ppx with asa bid and scds  - follow up AM labs

## 2017-02-17 NOTE — PROGRESS NOTE ADULT - SUBJECTIVE AND OBJECTIVE BOX
ORTHO NOTE    [ x] Pt seen/examined. 10;20am  [ ] Pt without any complaints/in NAD.    x[ ] Pt complains of: Incisional pain with movements     [ ] ROS  otherwise negative    .    PHYSICAL EXAM:    Vital Signs Last 24 Hrs  T(C): 35.7, Max: 36.7 (02-16 @ 20:16)  T(F): 96.3, Max: 98 (02-16 @ 20:16)  HR: 69 (53 - 78)  BP: 118/64 (88/42 - 134/85)  BP(mean): --  RR: 17 (13 - 18)  SpO2: 96% (96% - 100%)    I&O's Detail    I & Os for current day (as of 17 Feb 2017 11:08)  =============================================  IN:    lactated ringers.: 1125 ml    Lactated Ringers IV Bolus: 500 ml    Oral Fluid: 120 ml    IV PiggyBack: 50 ml    Total IN: 1795 ml  ---------------------------------------------  OUT:    Voided: 1350 ml    Total OUT: 1350 ml  ---------------------------------------------  Total NET: 445 ml       CAPILLARY BLOOD GLUCOSE                  Neuro: A&0x3    Lungs: Denies SOB    CV: Denies chest pain    ABD: NON distended positive bowel sounds      Ext: NVID Dressing clean dry and intact.      LABS                        11.1   5.7   )-----------( 174      ( 17 Feb 2017 08:50 )             33.1                                17 Feb 2017 08:50    142    |  107    |  11     ----------------------------<  89     3.8     |  29     |  0.67     Ca    8.9        17 Feb 2017 08:50                     [x ] Morbid Obesity: BMI =40.4         ASSESSMENT/PLAN:      STATUS POST: Left LENA (posterior)    CONTINUE:          [x ] PT WBAT    x[ ] DVT PPX- ASA    [x ] Pain MgtMEDICATIONS  (PRN):  oxyCODONE IR 5milliGRAM(s) Oral every 4 hours PRN Mild Pain  oxyCODONE IR 10milliGRAM(s) Oral every 4 hours PRN Moderate Pain  HYDROmorphone  Injectable 0.5milliGRAM(s) IV Push every 3 hours PRN Severe Pain (7 - 10)      [x ] Dispo plan- Home

## 2017-02-17 NOTE — DISCHARGE NOTE ADULT - PATIENT PORTAL LINK FT
“You can access the [facility] Patient Portal, offered by [facility], by registering with the following website: [email or web address]”

## 2017-02-18 VITALS
OXYGEN SATURATION: 100 % | RESPIRATION RATE: 16 BRPM | DIASTOLIC BLOOD PRESSURE: 85 MMHG | TEMPERATURE: 97 F | SYSTOLIC BLOOD PRESSURE: 130 MMHG | HEART RATE: 77 BPM

## 2017-02-18 RX ADMIN — CELECOXIB 200 MILLIGRAM(S): 200 CAPSULE ORAL at 09:20

## 2017-02-18 RX ADMIN — Medication 100 MILLIGRAM(S): at 06:00

## 2017-02-18 RX ADMIN — Medication 325 MILLIGRAM(S): at 06:00

## 2017-02-18 RX ADMIN — CELECOXIB 200 MILLIGRAM(S): 200 CAPSULE ORAL at 08:51

## 2017-02-18 RX ADMIN — Medication 1 TABLET(S): at 09:40

## 2017-02-18 RX ADMIN — GABAPENTIN 100 MILLIGRAM(S): 400 CAPSULE ORAL at 06:00

## 2017-02-18 NOTE — PROGRESS NOTE ADULT - ASSESSMENT
51y f s/p L LENA, posterior  POD#2  -Pain Control  -PT WBAT, posterior precautions   -DVT PPX ASA  -Dispo Planning - Home

## 2017-02-18 NOTE — OCCUPATIONAL THERAPY INITIAL EVALUATION ADULT - LEVEL OF INDEPENDENCE: DRESS LOWER BODY, OT EVAL
donning/doffing sock left lower extremity with Deepa 2/2 +stiffness/decreased AROM left hip/minimum assist (75% patients effort)

## 2017-02-18 NOTE — OCCUPATIONAL THERAPY INITIAL EVALUATION ADULT - GENERAL OBSERVATIONS, REHAB EVAL
Patient received seated up in bedside chair in non-acute distress, +IV heplock, + left hip dressing C/D/I. Patient denies pain at rest, reports left hip "stiffness" with activity.

## 2017-02-18 NOTE — OCCUPATIONAL THERAPY INITIAL EVALUATION ADULT - PERTINENT HX OF CURRENT PROBLEM, REHAB EVAL
51F with left hip pain x 2-3y. Pt. denies any accident/injury but states she has wear and tear/arthritis. Pt. states she had 2 steroid injections which the 2nd injection didn't relieve her pain. Pt. c/o pain with "everything." Left total hip arthroplasty on 02/16/2017.

## 2017-02-18 NOTE — PROGRESS NOTE ADULT - SUBJECTIVE AND OBJECTIVE BOX
NO events    T(F): 97.9, Max: 98.3 (02-17 @ 15:11)  HR: 76 (69 - 78)  BP: 99/63 (95/60 - 118/64)  RR: 16 (16 - 17)  SpO2: 98% (95% - 100%)  Wt(kg): --    I & Os for current day (as of 02-18 @ 07:38)  =============================================  IN:    Oral Fluid: 1260 ml    Total IN: 1260 ml  ---------------------------------------------  OUT:    Voided: 2100 ml    Total OUT: 2100 ml  ---------------------------------------------  Total NET: -840 ml      PE: DSG CDI. LLE Sensation to light touch intact S/S/DP/SP/Tib, Strength EHL/FHL/TA/GS 5/5, DP 2+, Ext WWP

## 2017-02-20 LAB
-  AZTREONAM: SIGNIFICANT CHANGE UP
-  CEFTAZIDIME: SIGNIFICANT CHANGE UP
-  CIPROFLOXACIN: SIGNIFICANT CHANGE UP
-  GENTAMICIN: SIGNIFICANT CHANGE UP
-  PIPERACILLIN/TAZOBACTAM: SIGNIFICANT CHANGE UP
-  TOBRAMYCIN: SIGNIFICANT CHANGE UP
CULTURE RESULTS: SIGNIFICANT CHANGE UP
METHOD TYPE: SIGNIFICANT CHANGE UP
ORGANISM # SPEC MICROSCOPIC CNT: SIGNIFICANT CHANGE UP
ORGANISM # SPEC MICROSCOPIC CNT: SIGNIFICANT CHANGE UP
SPECIMEN SOURCE: SIGNIFICANT CHANGE UP

## 2017-02-21 ENCOUNTER — RX RENEWAL (OUTPATIENT)
Age: 52
End: 2017-02-21

## 2017-02-22 DIAGNOSIS — M25.552 PAIN IN LEFT HIP: ICD-10-CM

## 2017-02-22 DIAGNOSIS — E66.9 OBESITY, UNSPECIFIED: ICD-10-CM

## 2017-02-22 DIAGNOSIS — Z87.891 PERSONAL HISTORY OF NICOTINE DEPENDENCE: ICD-10-CM

## 2017-02-22 DIAGNOSIS — M16.12 UNILATERAL PRIMARY OSTEOARTHRITIS, LEFT HIP: ICD-10-CM

## 2017-02-26 LAB — SURGICAL PATHOLOGY STUDY: SIGNIFICANT CHANGE UP

## 2017-02-27 DIAGNOSIS — I95.81 POSTPROCEDURAL HYPOTENSION: ICD-10-CM

## 2017-02-27 DIAGNOSIS — D64.9 ANEMIA, UNSPECIFIED: ICD-10-CM

## 2017-02-27 PROCEDURE — 87186 SC STD MICRODIL/AGAR DIL: CPT

## 2017-02-27 PROCEDURE — 72170 X-RAY EXAM OF PELVIS: CPT

## 2017-02-27 PROCEDURE — 97161 PT EVAL LOW COMPLEX 20 MIN: CPT

## 2017-02-27 PROCEDURE — 88300 SURGICAL PATH GROSS: CPT

## 2017-02-27 PROCEDURE — 97116 GAIT TRAINING THERAPY: CPT

## 2017-02-27 PROCEDURE — 81001 URINALYSIS AUTO W/SCOPE: CPT

## 2017-02-27 PROCEDURE — C1713: CPT

## 2017-02-27 PROCEDURE — 80048 BASIC METABOLIC PNL TOTAL CA: CPT

## 2017-02-27 PROCEDURE — 86900 BLOOD TYPING SEROLOGIC ABO: CPT

## 2017-02-27 PROCEDURE — C1776: CPT

## 2017-02-27 PROCEDURE — 81003 URINALYSIS AUTO W/O SCOPE: CPT

## 2017-02-27 PROCEDURE — 86901 BLOOD TYPING SEROLOGIC RH(D): CPT

## 2017-02-27 PROCEDURE — 86850 RBC ANTIBODY SCREEN: CPT

## 2017-02-27 PROCEDURE — 85027 COMPLETE CBC AUTOMATED: CPT

## 2017-02-27 PROCEDURE — 36415 COLL VENOUS BLD VENIPUNCTURE: CPT

## 2017-02-27 PROCEDURE — 87086 URINE CULTURE/COLONY COUNT: CPT

## 2017-02-27 PROCEDURE — 97535 SELF CARE MNGMENT TRAINING: CPT

## 2017-03-05 ENCOUNTER — FORM ENCOUNTER (OUTPATIENT)
Age: 52
End: 2017-03-05

## 2017-03-06 ENCOUNTER — OUTPATIENT (OUTPATIENT)
Dept: OUTPATIENT SERVICES | Facility: HOSPITAL | Age: 52
LOS: 1 days | End: 2017-03-06
Payer: COMMERCIAL

## 2017-03-06 ENCOUNTER — APPOINTMENT (OUTPATIENT)
Dept: ORTHOPEDIC SURGERY | Facility: CLINIC | Age: 52
End: 2017-03-06

## 2017-03-06 DIAGNOSIS — Z41.9 ENCOUNTER FOR PROCEDURE FOR PURPOSES OTHER THAN REMEDYING HEALTH STATE, UNSPECIFIED: Chronic | ICD-10-CM

## 2017-03-06 PROBLEM — M51.26 OTHER INTERVERTEBRAL DISC DISPLACEMENT, LUMBAR REGION: Chronic | Status: ACTIVE | Noted: 2017-02-15

## 2017-03-06 PROBLEM — D64.9 ANEMIA, UNSPECIFIED: Chronic | Status: ACTIVE | Noted: 2017-02-15

## 2017-03-06 PROBLEM — M19.90 UNSPECIFIED OSTEOARTHRITIS, UNSPECIFIED SITE: Chronic | Status: ACTIVE | Noted: 2017-02-15

## 2017-03-06 PROCEDURE — 73501 X-RAY EXAM HIP UNI 1 VIEW: CPT

## 2017-03-06 PROCEDURE — 73501 X-RAY EXAM HIP UNI 1 VIEW: CPT | Mod: 26,LT

## 2017-04-03 ENCOUNTER — APPOINTMENT (OUTPATIENT)
Dept: ORTHOPEDIC SURGERY | Facility: CLINIC | Age: 52
End: 2017-04-03

## 2017-04-03 RX ORDER — CELECOXIB 200 MG/1
200 CAPSULE ORAL TWICE DAILY
Qty: 84 | Refills: 0 | Status: DISCONTINUED | COMMUNITY
Start: 2017-02-09 | End: 2017-04-03

## 2017-04-03 RX ORDER — ASPIRIN/ACETAMINOPHEN/CAFFEINE 500-325-65
325 POWDER IN PACKET (EA) ORAL
Qty: 60 | Refills: 0 | Status: DISCONTINUED | COMMUNITY
Start: 2017-02-09 | End: 2017-04-03

## 2017-04-03 RX ORDER — PANTOPRAZOLE 40 MG/1
40 TABLET, DELAYED RELEASE ORAL DAILY
Qty: 30 | Refills: 0 | Status: DISCONTINUED | COMMUNITY
Start: 2017-02-09 | End: 2017-04-03

## 2017-05-29 ENCOUNTER — FORM ENCOUNTER (OUTPATIENT)
Age: 52
End: 2017-05-29

## 2017-05-30 ENCOUNTER — APPOINTMENT (OUTPATIENT)
Dept: ORTHOPEDIC SURGERY | Facility: CLINIC | Age: 52
End: 2017-05-30

## 2017-05-30 ENCOUNTER — OUTPATIENT (OUTPATIENT)
Dept: OUTPATIENT SERVICES | Facility: HOSPITAL | Age: 52
LOS: 1 days | End: 2017-05-30
Payer: COMMERCIAL

## 2017-05-30 VITALS
HEIGHT: 65 IN | BODY MASS INDEX: 40.82 KG/M2 | WEIGHT: 245 LBS | DIASTOLIC BLOOD PRESSURE: 80 MMHG | SYSTOLIC BLOOD PRESSURE: 140 MMHG

## 2017-05-30 DIAGNOSIS — Z41.9 ENCOUNTER FOR PROCEDURE FOR PURPOSES OTHER THAN REMEDYING HEALTH STATE, UNSPECIFIED: Chronic | ICD-10-CM

## 2017-05-30 PROCEDURE — 72100 X-RAY EXAM L-S SPINE 2/3 VWS: CPT | Mod: 26,59

## 2017-05-30 PROCEDURE — 72082 X-RAY EXAM ENTIRE SPI 2/3 VW: CPT

## 2017-05-30 PROCEDURE — 72082 X-RAY EXAM ENTIRE SPI 2/3 VW: CPT | Mod: 26

## 2017-05-30 PROCEDURE — 72100 X-RAY EXAM L-S SPINE 2/3 VWS: CPT

## 2017-06-09 ENCOUNTER — APPOINTMENT (OUTPATIENT)
Dept: ORTHOPEDIC SURGERY | Facility: CLINIC | Age: 52
End: 2017-06-09

## 2017-08-15 ENCOUNTER — APPOINTMENT (OUTPATIENT)
Dept: ORTHOPEDIC SURGERY | Facility: CLINIC | Age: 52
End: 2017-08-15
Payer: COMMERCIAL

## 2017-08-15 VITALS — HEIGHT: 65 IN | BODY MASS INDEX: 40.82 KG/M2 | WEIGHT: 245 LBS

## 2017-08-15 PROCEDURE — 99215 OFFICE O/P EST HI 40 MIN: CPT

## 2017-10-02 ENCOUNTER — FORM ENCOUNTER (OUTPATIENT)
Age: 52
End: 2017-10-02

## 2017-10-03 ENCOUNTER — APPOINTMENT (OUTPATIENT)
Dept: ORTHOPEDIC SURGERY | Facility: CLINIC | Age: 52
End: 2017-10-03
Payer: COMMERCIAL

## 2017-10-03 ENCOUNTER — OUTPATIENT (OUTPATIENT)
Dept: OUTPATIENT SERVICES | Facility: HOSPITAL | Age: 52
LOS: 1 days | End: 2017-10-03
Payer: COMMERCIAL

## 2017-10-03 VITALS — BODY MASS INDEX: 40.82 KG/M2 | HEIGHT: 65 IN | WEIGHT: 245 LBS

## 2017-10-03 DIAGNOSIS — M70.61 TROCHANTERIC BURSITIS, RIGHT HIP: ICD-10-CM

## 2017-10-03 DIAGNOSIS — Z41.9 ENCOUNTER FOR PROCEDURE FOR PURPOSES OTHER THAN REMEDYING HEALTH STATE, UNSPECIFIED: Chronic | ICD-10-CM

## 2017-10-03 DIAGNOSIS — M16.12 UNILATERAL PRIMARY OSTEOARTHRITIS, LEFT HIP: ICD-10-CM

## 2017-10-03 DIAGNOSIS — M16.11 UNILATERAL PRIMARY OSTEOARTHRITIS, RIGHT HIP: ICD-10-CM

## 2017-10-03 PROCEDURE — 99213 OFFICE O/P EST LOW 20 MIN: CPT | Mod: 25

## 2017-10-03 PROCEDURE — 72020 X-RAY EXAM OF SPINE 1 VIEW: CPT

## 2017-10-03 PROCEDURE — 20610 DRAIN/INJ JOINT/BURSA W/O US: CPT | Mod: RT

## 2017-10-03 PROCEDURE — 73502 X-RAY EXAM HIP UNI 2-3 VIEWS: CPT | Mod: 26,RT

## 2017-10-03 PROCEDURE — 73502 X-RAY EXAM HIP UNI 2-3 VIEWS: CPT

## 2017-10-03 PROCEDURE — 72020 X-RAY EXAM OF SPINE 1 VIEW: CPT | Mod: 26

## 2017-10-03 RX ORDER — GABAPENTIN 400 MG/1
400 CAPSULE ORAL
Refills: 0 | Status: DISCONTINUED | COMMUNITY
End: 2017-10-03

## 2017-10-03 RX ORDER — OXYCODONE AND ACETAMINOPHEN 5; 325 MG/1; MG/1
5-325 TABLET ORAL
Qty: 70 | Refills: 0 | Status: DISCONTINUED | COMMUNITY
Start: 2017-02-09 | End: 2017-10-03

## 2017-10-03 RX ORDER — DEXAMETHASONE SODIUM PHOSPHATE 4 MG/ML
120 INJECTION, SOLUTION INTRA-ARTICULAR; INTRALESIONAL; INTRAMUSCULAR; INTRAVENOUS; SOFT TISSUE
Refills: 0 | Status: COMPLETED | OUTPATIENT
Start: 2017-10-03

## 2017-10-03 RX ADMIN — DEXAMETHASONE SODIUM PHOSPHATE 1 MG/30ML: 4 INJECTION, SOLUTION INTRA-ARTICULAR; INTRALESIONAL; INTRAMUSCULAR; INTRAVENOUS; SOFT TISSUE at 00:00

## 2019-08-28 ENCOUNTER — FORM ENCOUNTER (OUTPATIENT)
Age: 54
End: 2019-08-28

## 2019-08-29 ENCOUNTER — OUTPATIENT (OUTPATIENT)
Dept: OUTPATIENT SERVICES | Facility: HOSPITAL | Age: 54
LOS: 1 days | End: 2019-08-29
Payer: COMMERCIAL

## 2019-08-29 ENCOUNTER — APPOINTMENT (OUTPATIENT)
Dept: ORTHOPEDIC SURGERY | Facility: CLINIC | Age: 54
End: 2019-08-29
Payer: COMMERCIAL

## 2019-08-29 DIAGNOSIS — Z41.9 ENCOUNTER FOR PROCEDURE FOR PURPOSES OTHER THAN REMEDYING HEALTH STATE, UNSPECIFIED: Chronic | ICD-10-CM

## 2019-08-29 DIAGNOSIS — M25.552 PAIN IN LEFT HIP: ICD-10-CM

## 2019-08-29 DIAGNOSIS — M54.16 SPINAL STENOSIS, LUMBAR REGION WITHOUT NEUROGENIC CLAUDICATION: ICD-10-CM

## 2019-08-29 DIAGNOSIS — M48.061 SPINAL STENOSIS, LUMBAR REGION WITHOUT NEUROGENIC CLAUDICATION: ICD-10-CM

## 2019-08-29 DIAGNOSIS — M16.11 UNILATERAL PRIMARY OSTEOARTHRITIS, RIGHT HIP: ICD-10-CM

## 2019-08-29 DIAGNOSIS — E66.01 MORBID (SEVERE) OBESITY DUE TO EXCESS CALORIES: ICD-10-CM

## 2019-08-29 DIAGNOSIS — M70.62 TROCHANTERIC BURSITIS, LEFT HIP: ICD-10-CM

## 2019-08-29 DIAGNOSIS — Z96.642 AFTERCARE FOLLOWING JOINT REPLACEMENT SURGERY: ICD-10-CM

## 2019-08-29 DIAGNOSIS — Z47.1 AFTERCARE FOLLOWING JOINT REPLACEMENT SURGERY: ICD-10-CM

## 2019-08-29 DIAGNOSIS — G89.29 PAIN IN LEFT HIP: ICD-10-CM

## 2019-08-29 PROCEDURE — 72020 X-RAY EXAM OF SPINE 1 VIEW: CPT

## 2019-08-29 PROCEDURE — 99213 OFFICE O/P EST LOW 20 MIN: CPT

## 2019-08-29 PROCEDURE — 72020 X-RAY EXAM OF SPINE 1 VIEW: CPT | Mod: 26

## 2019-08-29 PROCEDURE — 73521 X-RAY EXAM HIPS BI 2 VIEWS: CPT

## 2019-08-29 PROCEDURE — 73521 X-RAY EXAM HIPS BI 2 VIEWS: CPT | Mod: 26

## 2019-09-02 NOTE — DISCUSSION/SUMMARY
[de-identified] : Ms. Sawant is a 53 y/o F s/p left THR with right hip pain and DJD as well as back pain and spine osteoarthritis. I believe that she will eventually need a right THR but I would recommend non-surgical treatment options at this time before her arthritis progresses to the point that it is debilitating. We discussed the diagnosis and prognosis of the patient's condition. Non surgical treatment options include physical therapy and low impact exercise, weight loss for those who are overweight, NSAID and analgesic use, joint injections and activity modification including the use of assistive devices. I encouraged patient to lose weight as this will improve both her hip pain and her back pain. If patient has bariatric surgery, I informed her that she should wait at least a year before considering a right THR because the weight loss may affect her symptoms and she will need to recover from the bariatric surgery before having another one.\par \par I informed patient of the presence of a study on the use of a slower releasing cortisone medication for the hip joint that has been FDA approved for use in knees but not yet in hips. This medication can be injected anywhere, but as of now insurances will only cover it for use in knees because that's the only use with FDA approval. I explained that this medication is meant to last longer than the current FDA approved cortisone injections to the hip joint. I informed her that the company that makes the medication is sponsoring a study so if she chooses to participate, she will receive this medication for fee. Patient expressed interest in the study and was given the opportunity to speak to one of the research coordinators.  We discovered that she may not qualify for the study based on her BMI and back pain symptoms so I ordered a standard image-guided intra-articular steroid injection.\par \par I informed patient that her spine and her hip have similar severity of damage on X-ray imaging. I referred patient to a non-operative spine specialist and a spine surgeon. I advised her to consider having bariatric surgery before spine surgery if considering both.\par \par Follow up for image-guided intra-articular steroid injection.

## 2019-09-02 NOTE — PHYSICAL EXAM
[de-identified] : Overweight but well appearing. NAD. Alert and oriented x 3. No respiratory distress.\par Bilateral upper extremities: Skin intact. No deformity. Painless active ROM without evident restriction.\par Cervical, thoracic and lumbar spine: No visible deformity. Painless active ROM without evident restriction. No radicular pain on passive straight leg raise bilaterally.\par \par Pelvis: No pelvic obliquity. No tenderness.\par Leg lengths: Equal.\par \par Gait: Right coxalgic.\par Ambulatory assist devices: None.\par \par Right Hip:\par Skin intact. No surgical scars. No erythema. No ecchymosis. No swelling. No deformity. No focal tenderness.\par Painful ROM from full extension to 90 degrees of flexion. 0 degrees of internal rotation. 40 degrees of external rotation. 40 degrees of abduction. 5 degrees of adduction.\par ISA painful. Impingement (FADIR) painful. Stinchfield painful.\par No crepitation. No instability. Flexor power 5-/5.\par \par Left Hip:\par Skin intact. (+) Well healed surgical scar.  No erythema. No ecchymosis. No swelling. No deformity. (+) tenderness to side of hip.\par Painless ROM from full extension to 90 degrees of flexion. 10 degrees of internal rotation. 45-50 degrees of external rotation.15 degrees of abduction. 45 degrees of adduction.\par ISA painless. Impingement (FADIR) painless. Stinchfield painless.\par No crepitation. No instability. Flexor power 5/5.\par \par Bilateral Knees: Skin intact. No surgical scars. No erythema or ecchymosis. No swelling or effusion. No deformity. Painless and unrestricted range of motion. Central patellar tracking. No crepitation. No instability. \par \par Neurological: Intact distal crude touch sensation. Normal distal motor power. \par Cardiovascular: Lower extremities warm and well perfused. No peripheral edema. [de-identified] : X-ray imaging of the AP pelvis and right hip done here today demonstrates right hip with severe bone on bone joint space narrowing with subchondral sclerosis and cystic changes, progressed since prior imaging in 2017\par \par X-ray imaging of the lumbar spine demonstrates multilevel degenerative changes

## 2019-09-02 NOTE — END OF VISIT
[FreeTextEntry3] : All medical record entries made by Lauren Coello acting as a scribe for the performing provider (Kamron Garcia MD and/or MAGALY Burrell) on 08/29/2019. All entries were dictated to me by the performing medical provider. In signing this record, the medical provider affirms that they have personally performed the history, physical exam, assessment and plan and have also directed, reviewed and agreed to the documentation in the chart.

## 2019-09-02 NOTE — HISTORY OF PRESENT ILLNESS
[de-identified] : 54 year old F presents today for follow up evaluation of right hip pain. She reports marked, daily right hip pain localized to the side of the hip and buttock that occurs with both activity and rest. She also reports back pain. Patient can walk greater than 10 blocks with a slight limp. She has difficulty using stairs. She can enter public transportation but can only comfortably sit in a high chair for about 30 minutes. Patient is interested in an image guided injection to the right hip. She reports that her last injection to the right greater trochanteric bursa relieved pain for about a month.\par Ms. Sawant is s/p left THR 2/16/17 and doing well overall.  She reports slight, occasional left hip pain localized to the side of the hip that occurs with activity.\par Of note, patient reports that she was just approved for bariatric surgery by her insurance and she is now planning to set a date for the operation. She states that she's had a lot of trouble losing weight in the past.

## 2019-09-12 ENCOUNTER — FORM ENCOUNTER (OUTPATIENT)
Age: 54
End: 2019-09-12

## 2019-09-13 ENCOUNTER — OUTPATIENT (OUTPATIENT)
Dept: OUTPATIENT SERVICES | Facility: HOSPITAL | Age: 54
LOS: 1 days | End: 2019-09-13
Payer: COMMERCIAL

## 2019-09-13 ENCOUNTER — APPOINTMENT (OUTPATIENT)
Dept: ULTRASOUND IMAGING | Facility: HOSPITAL | Age: 54
End: 2019-09-13
Payer: COMMERCIAL

## 2019-09-13 DIAGNOSIS — Z41.9 ENCOUNTER FOR PROCEDURE FOR PURPOSES OTHER THAN REMEDYING HEALTH STATE, UNSPECIFIED: Chronic | ICD-10-CM

## 2019-09-13 PROCEDURE — 20611 DRAIN/INJ JOINT/BURSA W/US: CPT

## 2019-09-13 PROCEDURE — 20611 DRAIN/INJ JOINT/BURSA W/US: CPT | Mod: RT

## 2019-09-15 ENCOUNTER — TRANSCRIPTION ENCOUNTER (OUTPATIENT)
Age: 54
End: 2019-09-15

## 2019-09-16 ENCOUNTER — APPOINTMENT (OUTPATIENT)
Dept: PHYSICAL MEDICINE AND REHAB | Facility: CLINIC | Age: 54
End: 2019-09-16
Payer: COMMERCIAL

## 2019-09-16 VITALS
OXYGEN SATURATION: 100 % | BODY MASS INDEX: 44.15 KG/M2 | HEIGHT: 65 IN | WEIGHT: 265 LBS | RESPIRATION RATE: 16 BRPM | HEART RATE: 71 BPM

## 2019-09-16 DIAGNOSIS — M43.16 SPONDYLOLISTHESIS, LUMBAR REGION: ICD-10-CM

## 2019-09-16 PROCEDURE — 99203 OFFICE O/P NEW LOW 30 MIN: CPT

## 2019-09-16 RX ORDER — DICLOFENAC SODIUM 75 MG/1
75 TABLET, DELAYED RELEASE ORAL
Qty: 60 | Refills: 1 | Status: ACTIVE | COMMUNITY
Start: 2017-08-15 | End: 1900-01-01

## 2019-12-10 ENCOUNTER — TRANSCRIPTION ENCOUNTER (OUTPATIENT)
Age: 54
End: 2019-12-10

## 2019-12-12 ENCOUNTER — TRANSCRIPTION ENCOUNTER (OUTPATIENT)
Age: 54
End: 2019-12-12

## 2020-06-29 NOTE — OCCUPATIONAL THERAPY INITIAL EVALUATION ADULT - IMPAIRED TRANSFERS: SIT/STAND, REHAB EVAL
MA spoke with patient and scheduled patient for an earlier appointment to address his headaches.  Patient will email questionnaires back today.    left lower extremity/decreased strength

## 2024-04-30 NOTE — DISCHARGE NOTE ADULT - MEDICATION SUMMARY - MEDICATIONS TO STOP TAKING
Referral for allergy   
I will STOP taking the medications listed below when I get home from the hospital:    Tylenol 500 mg oral tablet  --  by mouth    ibuprofen  --  by mouth

## 2025-01-16 ENCOUNTER — HOSPITAL ENCOUNTER (OUTPATIENT)
Dept: HOSPITAL 74 - JASU-SURG | Age: 60
Discharge: HOME | End: 2025-01-16
Attending: STUDENT IN AN ORGANIZED HEALTH CARE EDUCATION/TRAINING PROGRAM
Payer: COMMERCIAL

## 2025-01-16 VITALS — SYSTOLIC BLOOD PRESSURE: 115 MMHG | DIASTOLIC BLOOD PRESSURE: 75 MMHG | HEART RATE: 67 BPM | TEMPERATURE: 97.5 F

## 2025-01-16 VITALS — RESPIRATION RATE: 20 BRPM

## 2025-01-16 VITALS — BODY MASS INDEX: 25.7 KG/M2

## 2025-01-16 DIAGNOSIS — M48.061: Primary | ICD-10-CM

## 2025-01-16 DIAGNOSIS — M54.16: ICD-10-CM

## 2025-01-16 PROCEDURE — 3E0R3BZ INTRODUCTION OF ANESTHETIC AGENT INTO SPINAL CANAL, PERCUTANEOUS APPROACH: ICD-10-PCS | Performed by: STUDENT IN AN ORGANIZED HEALTH CARE EDUCATION/TRAINING PROGRAM

## 2025-01-16 PROCEDURE — 3E0R33Z INTRODUCTION OF ANTI-INFLAMMATORY INTO SPINAL CANAL, PERCUTANEOUS APPROACH: ICD-10-PCS | Performed by: STUDENT IN AN ORGANIZED HEALTH CARE EDUCATION/TRAINING PROGRAM

## 2025-01-16 RX ADMIN — IOHEXOL ONE ML: 180 INJECTION INTRAVENOUS at 09:08

## 2025-01-16 RX ADMIN — LIDOCAINE HYDROCHLORIDE ONE MG: 10 INJECTION, SOLUTION EPIDURAL; INFILTRATION; INTRACAUDAL; PERINEURAL at 09:06

## 2025-01-16 RX ADMIN — DEXAMETHASONE SODIUM PHOSPHATE ONE MG: 10 INJECTION, SOLUTION INTRAMUSCULAR; INTRAVENOUS at 00:00

## 2025-01-16 RX ADMIN — DEXAMETHASONE SODIUM PHOSPHATE ONE MG: 10 INJECTION, SOLUTION INTRAMUSCULAR; INTRAVENOUS at 09:12

## 2025-01-16 RX ADMIN — LIDOCAINE HYDROCHLORIDE ONE MG: 10 INJECTION, SOLUTION EPIDURAL; INFILTRATION; INTRACAUDAL; PERINEURAL at 00:00

## 2025-01-16 RX ADMIN — IOHEXOL ONE ML: 180 INJECTION INTRAVENOUS at 00:00
